# Patient Record
Sex: MALE | Race: OTHER | NOT HISPANIC OR LATINO | URBAN - METROPOLITAN AREA
[De-identification: names, ages, dates, MRNs, and addresses within clinical notes are randomized per-mention and may not be internally consistent; named-entity substitution may affect disease eponyms.]

---

## 2023-12-19 ENCOUNTER — EMERGENCY (EMERGENCY)
Facility: HOSPITAL | Age: 33
LOS: 1 days | Discharge: HOME | End: 2023-12-19
Attending: EMERGENCY MEDICINE | Admitting: EMERGENCY MEDICINE
Payer: SELF-PAY

## 2023-12-19 VITALS
RESPIRATION RATE: 18 BRPM | OXYGEN SATURATION: 97 % | TEMPERATURE: 98 F | DIASTOLIC BLOOD PRESSURE: 68 MMHG | SYSTOLIC BLOOD PRESSURE: 106 MMHG | HEART RATE: 57 BPM

## 2023-12-19 PROCEDURE — 99283 EMERGENCY DEPT VISIT LOW MDM: CPT

## 2023-12-19 NOTE — ED PROVIDER NOTE - PROGRESS NOTE DETAILS
Darian PGY1: spoke with ophthal (Rakan licea).  will see if patient can come into clinic today or if they will come to hospital to consult. Darian PGY1: patient updated on plan.  agreeable and will head over at d/c.

## 2023-12-19 NOTE — ED PROVIDER NOTE - NSFOLLOWUPINSTRUCTIONS_ED_ALL_ED_FT
You were seen in the Emergency Department for foreign body in the eye.    1) Continue all previously prescribed medications as directed.    2) Follow up with your primary care physician - take copies of your results.    3) Return to the Emergency Department for worsening or persistent symptoms, and/or ANY NEW OR CONCERNING SYMPTOMS.     FOLLOW UP WITH OPHTHALMOLOGY:  [phone] [place] [address]    APPOINTMENT AT 2:30PM, BUT CAN BE SEEN EARLIER IF YOU ARRIVE EARLIER. You were seen in the Emergency Department for foreign body in the eye.    1) Continue all previously prescribed medications as directed.    2) Follow up with your primary care physician - take copies of your results.    3) Return to the Emergency Department for worsening or persistent symptoms, and/or ANY NEW OR CONCERNING SYMPTOMS.     FOLLOW UP WITH OPHTHALMOLOGY:  [phone] [place] [address]    APPOINTMENT AT 2:30PM, BUT CAN BE SEEN EARLIER IF YOU ARRIVE EARLIER.

## 2023-12-19 NOTE — ED PROVIDER NOTE - EYES, MLM
Clear bilaterally, VA 20/20 b/l. Noted to have small FB at 7 o'clock with developing yellowish ring around in right eye. EOMI b/l.

## 2023-12-19 NOTE — ED PROVIDER NOTE - PHYSICAL EXAMINATION
CONSTITUTIONAL: Well appearing, awake, alert, and in no apparent distress.  HEAD: normocephalic, atraumatic  EYES: Right eye with conjunctival injection, on fluorescein exam, 1mm foreign body seen over iris at 7 o'clock position, no other defects  ENT: oral mucosa moist  MSK: Spine appears normal, range of motion is not limited, no muscle or joint tenderness  NEURO: Alert and oriented, no focal deficits, no motor or sensory deficits.  SKIN: Skin normal color for race, warm, dry and intact. No evidence of rash.  PSYCH: appropriate mood and affect

## 2023-12-19 NOTE — ED PROVIDER NOTE - OBJECTIVE STATEMENT
33-year-old male with pain, redness, photosensitivity, to right eye for the past 4 days after getting a piece of metal in it for.  Patient reports 4 days ago was grinding metal, felt a piece hit his eye.  Attempted to flush it out with water.  No visual changes, fevers or other injuries.

## 2023-12-19 NOTE — ED PROVIDER NOTE - ATTENDING CONTRIBUTION TO CARE
Pt was seen and evaluated by me. Pt is a 34 y/o male with no significant PMHx who presented to the ED for FB to eye X 4 days. Pt states 4 days ago he was grinding metal felt a piece hit his eye. Pt denies any vision changes and attempted to wash out. Pt denies any fever, chills, nausea, vomiting, SOB, chest pain, or abd pain.  VITALS: Vitals have been reviewed.  GEN APPEARANCE: Alert and cooperative, non-toxic appearing and in NAD  HEAD: Atraumatic, normocephalic.   EYES: PERRL, EOMI. VA 20/20 b/l. Noted to have small FB at 7 o'clock with developing yellowish ring around in right eye  EARS: Gross hearing intact.   NOSE: No nasal discharge.   NEURO: Alert, follows commands. Speech normal.   SKIN: Normal color for race, warm, dry and intact. No evidence of rash.  34 y/o male with no significant PMHx who presented to the ED for FB to eye X 4 days.  Concern for FB to right eye with rust ring  Optho consult

## 2023-12-19 NOTE — ED PROVIDER NOTE - CLINICAL SUMMARY MEDICAL DECISION MAKING FREE TEXT BOX
33-year-old male pain and redness to the right eye for the past 4 days after he was grinding metal and got a piece in his eye.  Vital signs stable.  Fluorescein exam with punctate piece of metal over iris at 7 o'clock position, conjunctival injection.  No other corneal defects noted.  Will need removal of body.  Plan to consult ophthal.

## 2023-12-19 NOTE — ED PROVIDER NOTE - PATIENT PORTAL LINK FT
You can access the FollowMyHealth Patient Portal offered by [facility] by registering at the following website: [email or web address]. By joining [employer]’s FollowMyHealth portal, you will also be able to view your health information using other applications (apps) compatible with our system. You can access the FollowMyHealth Patient Portal offered by [facility] by registering at the following website: [email or web address]. By joining [employer]’s FollowMyHealth portal, you will also be able to view your health information using other applications (apps) compatible with our system.

## 2023-12-19 NOTE — ED PROVIDER NOTE - ENMT NEGATIVE STATEMENT, MLM
Decreased strength, functional mobilty and endurance
Ears: no ear pain and no hearing problems. Nose: no nasal congestion and no nasal drainage. Mouth/Throat: no dysphagia, no hoarseness and no throat pain. Neck: no lumps, no pain, no stiffness and no swollen glands.

## 2024-10-03 ENCOUNTER — EMERGENCY (EMERGENCY)
Facility: HOSPITAL | Age: 34
LOS: 1 days | Discharge: HOME | End: 2024-10-03
Admitting: STUDENT IN AN ORGANIZED HEALTH CARE EDUCATION/TRAINING PROGRAM
Payer: COMMERCIAL

## 2024-10-03 VITALS
DIASTOLIC BLOOD PRESSURE: 84 MMHG | RESPIRATION RATE: 18 BRPM | OXYGEN SATURATION: 98 % | SYSTOLIC BLOOD PRESSURE: 133 MMHG | TEMPERATURE: 98 F | HEART RATE: 60 BPM

## 2024-10-03 PROCEDURE — 99284 EMERGENCY DEPT VISIT MOD MDM: CPT

## 2024-10-03 PROCEDURE — 99053 MED SERV 10PM-8AM 24 HR FAC: CPT

## 2024-10-03 RX ORDER — LIDOCAINE 50 MG/G
1 CREAM TOPICAL ONCE
Refills: 0 | Status: COMPLETED | OUTPATIENT
Start: 2024-10-03 | End: 2024-10-03

## 2024-10-03 RX ADMIN — LIDOCAINE 1 PATCH: 50 CREAM TOPICAL at 23:24

## 2024-10-03 RX ADMIN — Medication 600 MILLIGRAM(S): at 23:24

## 2024-10-03 NOTE — ED ADULT TRIAGE NOTE - CHIEF COMPLAINT QUOTE
c/o pain to right elbow radiating down to the right hand with numbness to 4th and 5th digit  after banging his elbow three days ago. Denies pmhx

## 2024-10-04 PROBLEM — Z78.9 OTHER SPECIFIED HEALTH STATUS: Chronic | Status: ACTIVE | Noted: 2023-12-19

## 2024-10-04 PROCEDURE — 73060 X-RAY EXAM OF HUMERUS: CPT | Mod: 26,RT

## 2024-10-04 PROCEDURE — 73080 X-RAY EXAM OF ELBOW: CPT | Mod: 26,RT

## 2024-10-04 PROCEDURE — 73090 X-RAY EXAM OF FOREARM: CPT | Mod: 26,RT

## 2024-10-04 NOTE — ED PROVIDER NOTE - CRANIAL NERVE AND PUPILLARY EXAM
5/5 strength to the B/L UE, subjectively decreased sensation limited to the R 4th and 5th digit./cranial nerves 2-12 intact

## 2024-10-04 NOTE — ED PROVIDER NOTE - CLINICAL SUMMARY MEDICAL DECISION MAKING FREE TEXT BOX
32 Y/O R hand dominant male states that 2 days ago he banged his R arm on a two by four while at home. Pt states that he has had pain and numbness traveling down his R elbow to his R 4th and 5th digits. Pt states he has not taken medication prior to ED arrival. Plan is X-rays to eval for osseous abnormality which is of low clinical suspicion, pain control with Lidoderm and Ibuprofen.

## 2024-10-04 NOTE — ED PROVIDER NOTE - MUSCULOSKELETAL, MLM
Spine appears normal, range of motion is not limited. No focal osseous tenderness or palpable osseous abnormality, no crepitus, mild tenderness to the lateral aspect of the L epicondyle.

## 2024-10-04 NOTE — ED PROVIDER NOTE - PATIENT PORTAL LINK FT
You can access the FollowMyHealth Patient Portal offered by [facility] by registering at the following website: [email or web address]. By joining [employer]’s FollowMyHealth portal, you will also be able to view your health information using other applications (apps) compatible with our system.

## 2024-10-04 NOTE — ED PROVIDER NOTE - OBJECTIVE STATEMENT
32 Y/O R hand dominant male states that 2 days ago he banged his R arm on a two by four while at home. Pt states that he has had pain and numbness traveling down his R elbow to his R 4th and 5th digits. Pt states he has not taken medication prior to ED arrival. Pt denies sx in his face or leg. Pt denies any other sx or acute complaints.